# Patient Record
Sex: MALE | Race: WHITE | Employment: FULL TIME | URBAN - METROPOLITAN AREA
[De-identification: names, ages, dates, MRNs, and addresses within clinical notes are randomized per-mention and may not be internally consistent; named-entity substitution may affect disease eponyms.]

---

## 2020-01-27 ENCOUNTER — OFFICE VISIT (OUTPATIENT)
Dept: ORTHOPEDIC SURGERY | Age: 35
End: 2020-01-27
Payer: COMMERCIAL

## 2020-01-27 VITALS — BODY MASS INDEX: 27 KG/M2 | HEIGHT: 67 IN | WEIGHT: 172 LBS | RESPIRATION RATE: 16 BRPM

## 2020-01-27 PROCEDURE — L3908 WHO COCK-UP NONMOLDE PRE OTS: HCPCS | Performed by: ORTHOPAEDIC SURGERY

## 2020-01-27 PROCEDURE — 99203 OFFICE O/P NEW LOW 30 MIN: CPT | Performed by: ORTHOPAEDIC SURGERY

## 2020-01-27 RX ORDER — METHYLPHENIDATE HYDROCHLORIDE 10 MG/1
10 TABLET ORAL 2 TIMES DAILY
COMMUNITY

## 2020-01-27 RX ORDER — PREDNISONE 10 MG/1
TABLET ORAL
Qty: 26 TABLET | Refills: 0 | Status: SHIPPED | OUTPATIENT
Start: 2020-01-27

## 2020-01-27 RX ORDER — METHYLPHENIDATE HYDROCHLORIDE 40 MG/1
40 CAPSULE, EXTENDED RELEASE ORAL EVERY MORNING
COMMUNITY

## 2020-01-27 NOTE — PROGRESS NOTES
pronator teres regions. It is positive in the carpal tunnel and Phalen's test is positive  Intrinsic Muscle Strength: Normal  Extrinsic Muscle Strength: Normal  Special Tests: Extensor tendon remains centralized over the middle finger no real tenderness over the A1 pulley and no palpable triggering. Mild tenderness at the PIP joint level. Black's maneuver does produce some discomfort but no subluxation of the scaphoid    Phalen's test does produce some of the discomfort in the middle finger that he gets    Left Hand Examination:  Inspection: No swelling  Finger Range of Motion: Full  Wrist Range of Motion: Normal  Vascular Exam: Normal capillary refill  Neurologic Exam: No numbness or tingling  Intrinsic Muscle Strength: Normal  Extrinsic Muscle Strength: Normal  Special Tests: Negative Black's maneuver    Neck Exam: Slightly decreased range of motion provocative maneuvers today are negative    Additional Comments:     Additional Examinations:  X-Ray Findings: PA lateral and oblique x-rays of the right hand previously been obtained and were downloaded into his chart today. The scapholunate interval is just slightly wider than the remainder of the intercarpal spaces but still only about 3 mm. There is a signet ring sign on his PA view but no significant change in intercarpal angles on the lateral  Additional Diagnostic Test Findings:    Office Procedures:      Time statement: This dictation was performed with a verbal recognition program. It is possible that there are still dictated errors within this office note. All efforts were made to ensure that this office note is accurate. Orders Placed This Encounter   Procedures    EMG     EMG RUE     Order Specific Question:   Which body part? Answer:   RUE    Breg Wrist Pro Brace     Patient was prescribed a Breg Wrist Pro. The right wrist will require stabilization / immobilization from this semi-rigid / rigid orthosis to improve their function. The orthosis will assist in protecting the affected area, provide functional support and facilitate healing. The patient was educated and fit by a healthcare professional with expert knowledge and specialization in brace application while under the direct supervision of the treating physician. Verbal and written instructions for the use of and application of this item were provided. They were instructed to contact the office immediately should the brace result in increased pain, decreased sensation, increased swelling or worsening of the condition. Attestation: I have reviewed the chief complaint and history of present illness (including ROS and PFSH) and vital documentation by my staff and I agree with their documentation and have added where applicable.

## 2020-02-06 ENCOUNTER — TELEPHONE (OUTPATIENT)
Dept: ORTHOPEDIC SURGERY | Age: 35
End: 2020-02-06

## 2020-02-06 NOTE — TELEPHONE ENCOUNTER
Patient no showed EMG on 2/6/2020. Patient will need a referral to a different performing provider at this time. Message sent to JACKIE HOLDEN [facility] staff for review.

## 2020-02-19 ENCOUNTER — TELEPHONE (OUTPATIENT)
Dept: ORTHOPEDIC SURGERY | Age: 35
End: 2020-02-19

## 2020-02-20 ENCOUNTER — OFFICE VISIT (OUTPATIENT)
Dept: ORTHOPEDIC SURGERY | Age: 35
End: 2020-02-20
Payer: COMMERCIAL

## 2020-02-20 PROCEDURE — 95911 NRV CNDJ TEST 9-10 STUDIES: CPT | Performed by: PHYSICAL MEDICINE & REHABILITATION

## 2020-02-20 PROCEDURE — 95886 MUSC TEST DONE W/N TEST COMP: CPT | Performed by: PHYSICAL MEDICINE & REHABILITATION

## 2020-02-20 NOTE — PROGRESS NOTES
pollicis brevis. L Normal None None None Normal Normal None Normal Normal Full   Cervical paraspinals - Lower. L Normal None None None           Nerve Conduction Studies - Left Upper Extremity  The median motor nerve conduction study shows normal latency, normal amplitude and normal conduction velocity. The ulnar motor nerve conduction study shows normal latency, normal amplitude and normal conduction velocity. The orthodromic median sensory NCS shows normal peak latency and amplitude. The orthodromic ulnar sensory NCS shows normal peak latency and amplitude. The antidromic superficial radial sensory NCS shows normal peak latency and amplitude    Needle EMG Examination:     Insertional Spontaneous Activity Volitional MUAPs   Muscle Insertional Fibs +Wave Fasc Duration Amplitude Poly Config Recruitment Pattern   Deltoid. R Normal None None None Normal Normal None Normal Normal Full   Biceps Brachii. R Normal None None None Normal Normal None Normal Normal Full   Triceps. R Normal None None None Normal Normal None Normal Normal Full   Pronator Teres. R Normal None None None Normal Normal None Normal Normal Full   FDI. R Normal None None None Normal Normal None Normal Normal Full   Abductor pollicis brevis. R Normal None None None Normal Normal None Normal Normal Full   Cervical paraspinals - Lower. R Normal None None None             Nerve Conduction Studies - Right Upper Extremity  The median motor nerve conduction study shows normal latency, normal amplitude and normal conduction velocity. The ulnar motor nerve conduction study shows normal latency, normal amplitude and normal conduction velocity. The orthodromic median sensory NCS shows normal peak latency and amplitude. The orthodromic ulnar sensory NCS shows normal peak latency and amplitude. The antidromic superficial radial sensory NCS shows normal peak latency and amplitude. Impression:   1.  There is no electrodiagnostic evidence of a left median or ulnar neuropathy, peripheral neuropathy or cervical motor radiculopathy in the left upper extremity. 2. There is no electrodiagnostic evidence of a right ulnar or median neuropathy, peripheral neuropathy or cervical motor radiculopathy in the right upper extremity. Jamie Terrazas.  Madelyn Govea MD.

## 2020-02-27 ENCOUNTER — OFFICE VISIT (OUTPATIENT)
Dept: ORTHOPEDIC SURGERY | Age: 35
End: 2020-02-27
Payer: COMMERCIAL

## 2020-02-27 VITALS — BODY MASS INDEX: 26.99 KG/M2 | WEIGHT: 171.96 LBS | HEIGHT: 67 IN

## 2020-02-27 PROBLEM — M79.641 PAIN IN BOTH HANDS: Status: ACTIVE | Noted: 2020-02-27

## 2020-02-27 PROBLEM — M79.642 PAIN IN BOTH HANDS: Status: ACTIVE | Noted: 2020-02-27

## 2020-02-27 PROCEDURE — 99213 OFFICE O/P EST LOW 20 MIN: CPT | Performed by: ORTHOPAEDIC SURGERY

## 2020-02-27 NOTE — PROGRESS NOTES
on file prior to visit. No Known Allergies  Social History     Socioeconomic History    Marital status:      Spouse name: Not on file    Number of children: Not on file    Years of education: Not on file    Highest education level: Not on file   Occupational History    Not on file   Social Needs    Financial resource strain: Not on file    Food insecurity:     Worry: Not on file     Inability: Not on file    Transportation needs:     Medical: Not on file     Non-medical: Not on file   Tobacco Use    Smoking status: Never Smoker    Smokeless tobacco: Never Used   Substance and Sexual Activity    Alcohol use: Not on file    Drug use: Not on file    Sexual activity: Not on file   Lifestyle    Physical activity:     Days per week: Not on file     Minutes per session: Not on file    Stress: Not on file   Relationships    Social connections:     Talks on phone: Not on file     Gets together: Not on file     Attends Mandaeism service: Not on file     Active member of club or organization: Not on file     Attends meetings of clubs or organizations: Not on file     Relationship status: Not on file    Intimate partner violence:     Fear of current or ex partner: Not on file     Emotionally abused: Not on file     Physically abused: Not on file     Forced sexual activity: Not on file   Other Topics Concern    Not on file   Social History Narrative    Not on file     No family history on file. Physical Exam  Constitutional: Normal nutritional status  Mental Status: Alert and oriented  Skin: No rashes or erythema  Lymphatic: No lymphadenopathy    Hand Examination: No objective swelling. His main symptomatology is in the proximal phalanx just distal to the MP joint of middle and ring fingers particular the middle finger. He is mildly tender over the A1 pulleys but I do not palpate any definitive triggering.     Neurologic exam really negative Tinel's and Phalen's on clinical exam today.    Vascular exam normal capillary refill    Additional Comments:     Additional Examinations:  X-Ray Findings:    Additional Diagnostic Test Findings: EMG nerve conduction studies have been performed and are normal    Office Procedures:    Time Statement: This dictation was performed with a verbal recognition program. It is possible that there are still dictated errors within this office note. All efforts were made to ensure that this office note is accurate. No orders of the defined types were placed in this encounter. Attestation: I have reviewed the chief complaint and history of present illness (including ROS and PFSH) and vital documentation by my staff and I agree with their documentation and have added where applicable.

## 2020-09-17 ENCOUNTER — TELEPHONE (OUTPATIENT)
Dept: ORTHOPEDIC SURGERY | Age: 35
End: 2020-09-17